# Patient Record
Sex: OTHER/UNKNOWN | URBAN - METROPOLITAN AREA
[De-identification: names, ages, dates, MRNs, and addresses within clinical notes are randomized per-mention and may not be internally consistent; named-entity substitution may affect disease eponyms.]

---

## 2024-06-27 ENCOUNTER — APPOINTMENT (OUTPATIENT)
Dept: URBAN - METROPOLITAN AREA CLINIC 318 | Age: 29
Setting detail: DERMATOLOGY
End: 2024-07-01

## 2024-06-27 DIAGNOSIS — L21.8 OTHER SEBORRHEIC DERMATITIS: ICD-10-CM

## 2024-06-27 PROCEDURE — OTHER ADDITIONAL NOTES: OTHER

## 2024-06-27 PROCEDURE — OTHER PRESCRIPTION: OTHER

## 2024-06-27 PROCEDURE — OTHER COUNSELING: OTHER

## 2024-06-27 PROCEDURE — 99204 OFFICE O/P NEW MOD 45 MIN: CPT

## 2024-06-27 PROCEDURE — OTHER PRESCRIPTION MEDICATION MANAGEMENT: OTHER

## 2024-06-27 PROCEDURE — OTHER MIPS QUALITY: OTHER

## 2024-06-27 RX ORDER — KETOCONAZOLE 20 MG/ML
SHAMPOO, SUSPENSION TOPICAL
Qty: 120 | Refills: 3 | Status: ERX | COMMUNITY
Start: 2024-06-27

## 2024-06-27 RX ORDER — KETOCONAZOLE 20 MG/G
CREAM TOPICAL
Qty: 60 | Refills: 2 | Status: ERX | COMMUNITY
Start: 2024-06-27

## 2024-06-27 ASSESSMENT — LOCATION DETAILED DESCRIPTION DERM
LOCATION DETAILED: RIGHT CENTRAL MALAR CHEEK
LOCATION DETAILED: LEFT CENTRAL MALAR CHEEK
LOCATION DETAILED: POSTERIOR MID-PARIETAL SCALP
LOCATION DETAILED: LEFT MEDIAL FRONTAL SCALP

## 2024-06-27 ASSESSMENT — LOCATION SIMPLE DESCRIPTION DERM
LOCATION SIMPLE: LEFT SCALP
LOCATION SIMPLE: RIGHT CHEEK
LOCATION SIMPLE: LEFT CHEEK
LOCATION SIMPLE: POSTERIOR SCALP

## 2024-06-27 ASSESSMENT — LOCATION ZONE DERM
LOCATION ZONE: FACE
LOCATION ZONE: SCALP

## 2024-06-27 NOTE — PROCEDURE: PRESCRIPTION MEDICATION MANAGEMENT
Initiate Treatment: ketoconazole 2 % shampoo \\nQuantity: 120.0 ml  Days Supply: 30\\nSig: Use as a facial  and scalp wash twice a day for 2-4 weeks, then 2-3 x a week for maintenance.\\n\\nketoconazole 2 % topical cream \\nQuantity: 60.0 g  Days Supply: 30\\nSig: Apply to the face twice a day  X 2-4 weeks, then 2-3 X a week for maintenance.
Render In Strict Bullet Format?: No
Detail Level: Zone

## 2024-06-27 NOTE — PROCEDURE: ADDITIONAL NOTES
Detail Level: Simple
Render Risk Assessment In Note?: no
Additional Notes: IF PATIENT IS NOT BETTER WITH KETOCONAZOLE CREAM ZORYVE CREAM TO BE PRESCRIBED

## 2024-07-01 ENCOUNTER — RX ONLY (RX ONLY)
Age: 29
End: 2024-07-01

## 2024-07-01 RX ORDER — ROFLUMILAST 3 MG/G
AEROSOL, FOAM TOPICAL
Qty: 60 | Refills: 4 | Status: ERX | COMMUNITY
Start: 2024-07-01

## 2024-08-01 ENCOUNTER — APPOINTMENT (OUTPATIENT)
Dept: URBAN - METROPOLITAN AREA CLINIC 318 | Age: 29
Setting detail: DERMATOLOGY
End: 2024-08-01

## 2024-08-01 DIAGNOSIS — L71.8 OTHER ROSACEA: ICD-10-CM

## 2024-08-01 DIAGNOSIS — L21.8 OTHER SEBORRHEIC DERMATITIS: ICD-10-CM

## 2024-08-01 PROCEDURE — OTHER OTC TREATMENT REGIMEN: OTHER

## 2024-08-01 PROCEDURE — OTHER PRESCRIPTION MEDICATION MANAGEMENT: OTHER

## 2024-08-01 PROCEDURE — OTHER MIPS QUALITY: OTHER

## 2024-08-01 PROCEDURE — OTHER DEFER: OTHER

## 2024-08-01 PROCEDURE — OTHER COUNSELING: OTHER

## 2024-08-01 PROCEDURE — 99214 OFFICE O/P EST MOD 30 MIN: CPT

## 2024-08-01 ASSESSMENT — LOCATION DETAILED DESCRIPTION DERM
LOCATION DETAILED: RIGHT INFERIOR CENTRAL MALAR CHEEK
LOCATION DETAILED: LEFT INFERIOR CENTRAL MALAR CHEEK
LOCATION DETAILED: LEFT SUPERIOR MEDIAL BUCCAL CHEEK
LOCATION DETAILED: RIGHT CENTRAL MALAR CHEEK

## 2024-08-01 ASSESSMENT — LOCATION ZONE DERM: LOCATION ZONE: FACE

## 2024-08-01 ASSESSMENT — LOCATION SIMPLE DESCRIPTION DERM
LOCATION SIMPLE: LEFT CHEEK
LOCATION SIMPLE: RIGHT CHEEK

## 2024-08-01 NOTE — PROCEDURE: DEFER
X Size Of Lesion In Cm (Optional): 0
Introduction Text (Please End With A Colon): The following procedure was deferred: Rosacea TX
Reason To Defer Override: PATIENT DECLINED ORAL PILLS AS PATIENT HAS A SENSITIVE STOMACH
Detail Level: Detailed

## 2024-08-01 NOTE — PROCEDURE: COUNSELING
Detail Level: Detailed
Patient Specific Counseling (Will Not Stick From Patient To Patient): DEFER TX \\nPREFERS TO WATCH DIET

## 2024-08-01 NOTE — PROCEDURE: OTC TREATMENT REGIMEN
Patient Specific Otc Recommendations (Will Not Stick From Patient To Patient): LA ROCHE TRIPLE REPAIR
Detail Level: Zone

## 2024-09-12 ENCOUNTER — RX ONLY (RX ONLY)
Age: 29
End: 2024-09-12

## 2024-09-12 RX ORDER — BRIMONID/IVERMEC/METRONID/NIAC 0.25-1-1 %
GEL (GRAM) TOPICAL
Qty: 30 | Refills: 3 | Status: ERX | COMMUNITY
Start: 2024-09-12

## 2024-10-31 ENCOUNTER — RX ONLY (RX ONLY)
Age: 29
End: 2024-10-31

## 2024-10-31 RX ORDER — BRIMONID/IVERMEC/METRONID/NIAC 0.25-1-1 %
GEL (GRAM) TOPICAL
Qty: 30 | Refills: 3 | Status: ERX

## 2024-10-31 RX ORDER — BRIMONID/IVERMEC/METRONID/NIAC 0.25-1-1 %
GEL (GRAM) TOPICAL
Qty: 30 | Refills: 3 | Status: CANCELLED

## 2024-10-31 RX ORDER — SALICYLIC ACID/SULFACETAMIDE 5 %-10 %
SUSPENSION, TOPICAL (ML) TOPICAL
Qty: 120 | Refills: 6 | Status: ERX | COMMUNITY
Start: 2024-10-31

## 2025-02-18 ENCOUNTER — RX ONLY (RX ONLY)
Age: 30
End: 2025-02-18

## 2025-02-18 RX ORDER — BRIMONID/IVERMEC/METRONID/NIAC 0.25-1-1 %
GEL (GRAM) TOPICAL
Qty: 30 | Refills: 3 | Status: ERX